# Patient Record
Sex: FEMALE | Race: WHITE
[De-identification: names, ages, dates, MRNs, and addresses within clinical notes are randomized per-mention and may not be internally consistent; named-entity substitution may affect disease eponyms.]

---

## 2017-03-07 ENCOUNTER — HOSPITAL ENCOUNTER (OUTPATIENT)
Dept: HOSPITAL 47 - LABWHC1 | Age: 62
End: 2017-03-07
Payer: COMMERCIAL

## 2017-03-07 DIAGNOSIS — E78.2: ICD-10-CM

## 2017-03-07 DIAGNOSIS — M81.0: ICD-10-CM

## 2017-03-07 DIAGNOSIS — L50.9: ICD-10-CM

## 2017-03-07 DIAGNOSIS — F32.9: ICD-10-CM

## 2017-03-07 DIAGNOSIS — E03.9: Primary | ICD-10-CM

## 2017-03-07 LAB
ALP SERPL-CCNC: 61 U/L (ref 38–126)
ALT SERPL-CCNC: 47 U/L (ref 9–52)
ANION GAP SERPL CALC-SCNC: 10 MMOL/L
AST SERPL-CCNC: 36 U/L (ref 14–36)
BASOPHILS # BLD AUTO: 0.1 K/UL (ref 0–0.2)
BASOPHILS NFR BLD AUTO: 1 %
BUN SERPL-SCNC: 19 MG/DL (ref 7–17)
CALCIUM SPEC-MCNC: 9.5 MG/DL (ref 8.4–10.2)
CH: 31
CHCM: 33.9
CHLORIDE SERPL-SCNC: 101 MMOL/L (ref 98–107)
CHOLEST SERPL-MCNC: 229 MG/DL (ref ?–200)
CO2 SERPL-SCNC: 28 MMOL/L (ref 22–30)
EOSINOPHIL # BLD AUTO: 0.2 K/UL (ref 0–0.7)
EOSINOPHIL NFR BLD AUTO: 3 %
ERYTHROCYTE [DISTWIDTH] IN BLOOD BY AUTOMATED COUNT: 4.6 M/UL (ref 3.8–5.4)
ERYTHROCYTE [DISTWIDTH] IN BLOOD: 12.7 % (ref 11.5–15.5)
GLUCOSE SERPL-MCNC: 93 MG/DL (ref 74–99)
HCT VFR BLD AUTO: 42.2 % (ref 34–46)
HDLC SERPL-MCNC: 57 MG/DL (ref 40–60)
HDW: 2.5
HGB BLD-MCNC: 13.9 GM/DL (ref 11.4–16)
LUC NFR BLD AUTO: 2 %
LYMPHOCYTES # SPEC AUTO: 2.2 K/UL (ref 1–4.8)
LYMPHOCYTES NFR SPEC AUTO: 29 %
MCH RBC QN AUTO: 30.1 PG (ref 25–35)
MCHC RBC AUTO-ENTMCNC: 32.8 G/DL (ref 31–37)
MCV RBC AUTO: 91.7 FL (ref 80–100)
MONOCYTES # BLD AUTO: 0.4 K/UL (ref 0–1)
MONOCYTES NFR BLD AUTO: 6 %
NEUTROPHILS # BLD AUTO: 4.3 K/UL (ref 1.3–7.7)
NEUTROPHILS NFR BLD AUTO: 59 %
NON-AFRICAN AMERICAN GFR(MDRD): >60
POTASSIUM SERPL-SCNC: 5 MMOL/L (ref 3.5–5.1)
PROT SERPL-MCNC: 6.8 G/DL (ref 6.3–8.2)
SODIUM SERPL-SCNC: 139 MMOL/L (ref 137–145)
TRIGL SERPL-MCNC: 177 MG/DL (ref ?–150)
VIT B12 SERPL-MCNC: 965 PG/ML (ref 239–931)
WBC # BLD AUTO: 0.14 10*3/UL
WBC # BLD AUTO: 7.3 K/UL (ref 3.8–10.6)
WBC (PEROX): 7.27

## 2017-03-07 PROCEDURE — 84443 ASSAY THYROID STIM HORMONE: CPT

## 2017-03-07 PROCEDURE — 82306 VITAMIN D 25 HYDROXY: CPT

## 2017-03-07 PROCEDURE — 85025 COMPLETE CBC W/AUTO DIFF WBC: CPT

## 2017-03-07 PROCEDURE — 82607 VITAMIN B-12: CPT

## 2017-03-07 PROCEDURE — 80061 LIPID PANEL: CPT

## 2017-03-07 PROCEDURE — 84439 ASSAY OF FREE THYROXINE: CPT

## 2017-03-07 PROCEDURE — 36415 COLL VENOUS BLD VENIPUNCTURE: CPT

## 2017-03-07 PROCEDURE — 80053 COMPREHEN METABOLIC PANEL: CPT

## 2017-09-14 ENCOUNTER — HOSPITAL ENCOUNTER (OUTPATIENT)
Dept: HOSPITAL 47 - RADMAMWWP | Age: 62
Discharge: HOME | End: 2017-09-14
Payer: COMMERCIAL

## 2017-09-14 DIAGNOSIS — Z12.31: Primary | ICD-10-CM

## 2017-09-14 DIAGNOSIS — N95.1: ICD-10-CM

## 2017-09-14 DIAGNOSIS — M85.88: ICD-10-CM

## 2017-09-14 PROCEDURE — 77080 DXA BONE DENSITY AXIAL: CPT

## 2017-09-14 NOTE — BD
EXAMINATION TYPE: MG DEXA axial skeleton.  

 

DATE OF EXAM: 9/14/2017

 

COMPARISON: NONE

 

CLINICAL HISTORY: N95.1 POST MENOPAUSAL, M89.9 DISORDER OF BONE

 

 

Height:  61

Weight:  130

 

FRAX RISK QUESTIONS:

Alcohol (3 or more units per day):  NO

Family History (Parent hip fracture):  NO

Glucocorticoids (More than 3mos):  NO

           (Ex: prednisone, prednisolone, methylprednisolone, dexamethasone, and hydrocortisone).    
     

History of Fracture in Adulthood: NO

Secondary Osteoporosis: NO

  1.  Type 1 Diabetes: NO

  2.  Hyperthyroidism: YES

  3.  Menopause before 45: NO

  4.  Malnutrition: NO

  5.  Chronic liver disease: NO

Rheumatoid Arthritis: NO

Current Tobacco Use: NO

 

RISK FACTORS 

HISTORY OF: 

Family History of Osteoporosis: NONE KNOWN

Active: NOT REALLY

Diet low in dairy products/other sources of calcium:  NO

Postmenopausal woman: AT 53 YRS OLD

Lost more than 2 inches in height since high school: NO

Hyperparathyroidism: YES

Adrenal Insufficiency: NO

 

MEDICATIONS: 

Thyroid Medications:  YES, SYNTHROID, .88

How Long: 3 YRS

Additional Medications:  VIT D3, PB MED, NEXIUM, PROZAC, XANAX AND OTHER ANTIDEPRESSANTS,  

 

 

Additional History: HYPERCALCEMIA, ANXIETY, HYPERTENSION 

 

 

EXAM MEASUREMENTS: 

Bone mineral densitometry was performed using the [employer] System.

Bone mineral density as measured about the Lumbar spine is:  

----- L1-L4(G/cm2): 1.168

T Score Values are as follows:

----- L1: -0.8

----- L2: -0.4

----- L3:  0.7

----- L4:  -0.2

----- L1-L4: -0.1

Bone mineral density    THIS IS HER FIRST BONE DENSITY TEST AT [facility]

 

Bone mineral density about the R hip (g/cm2): 0.886

Bone mineral density about the L hip (g/cm2): 0.871

T Score values are as follows:

-----R Neck: -1.7

-----L Neck: -1.5

-----R Total: -1.0

-----L Total:  -1.1

Bone mineral density    FIRST BONE DENSITY AT [facility] 

 

FRAX%'S:   THERE IS A 8.9% CHANCE FOR MAJOR OSTEOPOROTIC FX AND A 0.9% FOR A HIP FX.....PROBABILITY O
F FX IN 10 YRS TIME

 

IMPRESSION:

Osteopenia (T Score between -2.5 and -1 as noted by T score values in both hips.

There is slightly increased risk of fracture and the patient may be considered 

for treatment. Re-Screen 2-5 years     

 

NOTE:  T-SCORE=SD OF THE YOUNG ADULT MEAN.

## 2017-09-15 NOTE — MM
Reason for exam: screening  (asymptomatic).

Last mammogram was performed 1 year ago.



History:

Patient is postmenopausal.



Physical Findings:

A clinical breast exam by your physician is recommended on an annual basis and 

results should be correlated with mammographic findings.



MG Screening Mammo w CAD

Bilateral CC and MLO view(s) were taken.

Prior study comparison: September 13, 2016, bilateral MG screening mammo w CAD.  

February 22, 2016, right breast MG 3d diag mammo w/cad RT.

The breast tissue is extremely dense which could obscure a lesion on mammography. 

No significant changes when compared with prior studies.





ASSESSMENT: Benign, BI-RAD 2



RECOMMENDATION:

Routine screening mammogram of both breasts in 1 year.

## 2017-10-23 ENCOUNTER — HOSPITAL ENCOUNTER (OUTPATIENT)
Dept: HOSPITAL 47 - LABWHC1 | Age: 62
Discharge: HOME | End: 2017-10-23
Payer: COMMERCIAL

## 2017-10-23 DIAGNOSIS — E78.2: ICD-10-CM

## 2017-10-23 DIAGNOSIS — Z00.00: Primary | ICD-10-CM

## 2017-10-23 DIAGNOSIS — G43.909: ICD-10-CM

## 2017-10-23 DIAGNOSIS — F41.1: ICD-10-CM

## 2017-10-23 DIAGNOSIS — K76.0: ICD-10-CM

## 2017-10-23 DIAGNOSIS — E03.9: ICD-10-CM

## 2017-10-23 DIAGNOSIS — M81.0: ICD-10-CM

## 2017-10-23 LAB
ALP SERPL-CCNC: 62 U/L (ref 38–126)
ALT SERPL-CCNC: 33 U/L (ref 9–52)
ANION GAP SERPL CALC-SCNC: 8 MMOL/L
AST SERPL-CCNC: 26 U/L (ref 14–36)
BASOPHILS # BLD AUTO: 0 K/UL (ref 0–0.2)
BASOPHILS NFR BLD AUTO: 1 %
BUN SERPL-SCNC: 17 MG/DL (ref 7–17)
CALCIUM SPEC-MCNC: 9.3 MG/DL (ref 8.4–10.2)
CH: 31
CHCM: 33.5
CHLORIDE SERPL-SCNC: 98 MMOL/L (ref 98–107)
CHOLEST SERPL-MCNC: 232 MG/DL (ref ?–200)
CO2 SERPL-SCNC: 28 MMOL/L (ref 22–30)
EOSINOPHIL # BLD AUTO: 0.1 K/UL (ref 0–0.7)
EOSINOPHIL NFR BLD AUTO: 2 %
ERYTHROCYTE [DISTWIDTH] IN BLOOD BY AUTOMATED COUNT: 4.12 M/UL (ref 3.8–5.4)
ERYTHROCYTE [DISTWIDTH] IN BLOOD: 12.2 % (ref 11.5–15.5)
GLUCOSE SERPL-MCNC: 88 MG/DL (ref 74–99)
HCT VFR BLD AUTO: 38.3 % (ref 34–46)
HDLC SERPL-MCNC: 64 MG/DL (ref 40–60)
HDW: 2.25
HGB BLD-MCNC: 12.6 GM/DL (ref 11.4–16)
LUC NFR BLD AUTO: 2 %
LYMPHOCYTES # SPEC AUTO: 1.7 K/UL (ref 1–4.8)
LYMPHOCYTES NFR SPEC AUTO: 31 %
MAGNESIUM SPEC-SCNC: 2 MG/DL (ref 1.6–2.3)
MCH RBC QN AUTO: 30.6 PG (ref 25–35)
MCHC RBC AUTO-ENTMCNC: 32.9 G/DL (ref 31–37)
MCV RBC AUTO: 92.9 FL (ref 80–100)
MONOCYTES # BLD AUTO: 0.4 K/UL (ref 0–1)
MONOCYTES NFR BLD AUTO: 6 %
NEUTROPHILS # BLD AUTO: 3.3 K/UL (ref 1.3–7.7)
NEUTROPHILS NFR BLD AUTO: 59 %
NON-AFRICAN AMERICAN GFR(MDRD): >60
POTASSIUM SERPL-SCNC: 4.5 MMOL/L (ref 3.5–5.1)
PROT SERPL-MCNC: 6.5 G/DL (ref 6.3–8.2)
SODIUM SERPL-SCNC: 134 MMOL/L (ref 137–145)
VIT B12 SERPL-MCNC: 1085 PG/ML (ref 200–944)
WBC # BLD AUTO: 0.09 10*3/UL
WBC # BLD AUTO: 5.6 K/UL (ref 3.8–10.6)
WBC (PEROX): 5.55

## 2017-10-23 PROCEDURE — 80061 LIPID PANEL: CPT

## 2017-10-23 PROCEDURE — 85025 COMPLETE CBC W/AUTO DIFF WBC: CPT

## 2017-10-23 PROCEDURE — 82306 VITAMIN D 25 HYDROXY: CPT

## 2017-10-23 PROCEDURE — 82607 VITAMIN B-12: CPT

## 2017-10-23 PROCEDURE — 84439 ASSAY OF FREE THYROXINE: CPT

## 2017-10-23 PROCEDURE — 84443 ASSAY THYROID STIM HORMONE: CPT

## 2017-10-23 PROCEDURE — 83735 ASSAY OF MAGNESIUM: CPT

## 2017-10-23 PROCEDURE — 36415 COLL VENOUS BLD VENIPUNCTURE: CPT

## 2017-10-23 PROCEDURE — 80053 COMPREHEN METABOLIC PANEL: CPT

## 2018-03-15 ENCOUNTER — HOSPITAL ENCOUNTER (OUTPATIENT)
Dept: HOSPITAL 47 - LABWHC1 | Age: 63
Discharge: HOME | End: 2018-03-15
Payer: COMMERCIAL

## 2018-03-15 DIAGNOSIS — E03.9: ICD-10-CM

## 2018-03-15 DIAGNOSIS — R79.89: ICD-10-CM

## 2018-03-15 DIAGNOSIS — G47.00: Primary | ICD-10-CM

## 2018-03-15 DIAGNOSIS — E78.1: ICD-10-CM

## 2018-03-15 DIAGNOSIS — K76.0: ICD-10-CM

## 2018-03-15 LAB
ALBUMIN SERPL-MCNC: 4.3 G/DL (ref 3.5–5)
ALP SERPL-CCNC: 72 U/L (ref 38–126)
ALT SERPL-CCNC: 37 U/L (ref 9–52)
ANION GAP SERPL CALC-SCNC: 11 MMOL/L
AST SERPL-CCNC: 34 U/L (ref 14–36)
BUN SERPL-SCNC: 20 MG/DL (ref 7–17)
CALCIUM SPEC-MCNC: 10.2 MG/DL (ref 8.4–10.2)
CHLORIDE SERPL-SCNC: 97 MMOL/L (ref 98–107)
CHOLEST SERPL-MCNC: 233 MG/DL (ref ?–200)
CO2 SERPL-SCNC: 30 MMOL/L (ref 22–30)
ERYTHROCYTE [DISTWIDTH] IN BLOOD BY AUTOMATED COUNT: 4.51 M/UL (ref 3.8–5.4)
ERYTHROCYTE [DISTWIDTH] IN BLOOD: 12.8 % (ref 11.5–15.5)
GLUCOSE SERPL-MCNC: 84 MG/DL (ref 74–99)
HBA1C MFR BLD: 5.2 % (ref 4–6)
HCT VFR BLD AUTO: 40.5 % (ref 34–46)
HDLC SERPL-MCNC: 59 MG/DL (ref 40–60)
HGB BLD-MCNC: 13.5 GM/DL (ref 11.4–16)
LDLC SERPL CALC-MCNC: 154 MG/DL (ref 0–99)
MCH RBC QN AUTO: 30 PG (ref 25–35)
MCHC RBC AUTO-ENTMCNC: 33.4 G/DL (ref 31–37)
MCV RBC AUTO: 89.8 FL (ref 80–100)
PLATELET # BLD AUTO: 382 K/UL (ref 150–450)
POTASSIUM SERPL-SCNC: 5.4 MMOL/L (ref 3.5–5.1)
PROT SERPL-MCNC: 6.9 G/DL (ref 6.3–8.2)
SODIUM SERPL-SCNC: 138 MMOL/L (ref 137–145)
T4 FREE SERPL-MCNC: 1.78 NG/DL (ref 0.78–2.19)
TRIGL SERPL-MCNC: 100 MG/DL (ref ?–150)
URATE SERPL-MCNC: 3.5 MG/DL (ref 3.7–7.4)
VIT B12 SERPL-MCNC: 1245 PG/ML (ref 200–944)
WBC # BLD AUTO: 8.5 K/UL (ref 3.8–10.6)

## 2018-03-15 PROCEDURE — 84439 ASSAY OF FREE THYROXINE: CPT

## 2018-03-15 PROCEDURE — 84550 ASSAY OF BLOOD/URIC ACID: CPT

## 2018-03-15 PROCEDURE — 80053 COMPREHEN METABOLIC PANEL: CPT

## 2018-03-15 PROCEDURE — 80074 ACUTE HEPATITIS PANEL: CPT

## 2018-03-15 PROCEDURE — 82533 TOTAL CORTISOL: CPT

## 2018-03-15 PROCEDURE — 84443 ASSAY THYROID STIM HORMONE: CPT

## 2018-03-15 PROCEDURE — 82607 VITAMIN B-12: CPT

## 2018-03-15 PROCEDURE — 83036 HEMOGLOBIN GLYCOSYLATED A1C: CPT

## 2018-03-15 PROCEDURE — 80061 LIPID PANEL: CPT

## 2018-03-15 PROCEDURE — 36415 COLL VENOUS BLD VENIPUNCTURE: CPT

## 2018-03-15 PROCEDURE — 85027 COMPLETE CBC AUTOMATED: CPT

## 2018-08-15 ENCOUNTER — HOSPITAL ENCOUNTER (OUTPATIENT)
Dept: HOSPITAL 47 - LABWHC1 | Age: 63
Discharge: HOME | End: 2018-08-15
Attending: FAMILY MEDICINE
Payer: COMMERCIAL

## 2018-08-15 DIAGNOSIS — E78.2: ICD-10-CM

## 2018-08-15 DIAGNOSIS — Z00.00: Primary | ICD-10-CM

## 2018-08-15 DIAGNOSIS — F41.1: ICD-10-CM

## 2018-08-15 DIAGNOSIS — G43.909: ICD-10-CM

## 2018-08-15 DIAGNOSIS — R53.82: ICD-10-CM

## 2018-08-15 DIAGNOSIS — M81.0: ICD-10-CM

## 2018-08-15 LAB
ALBUMIN SERPL-MCNC: 4.1 G/DL (ref 3.5–5)
ALP SERPL-CCNC: 68 U/L (ref 38–126)
ALT SERPL-CCNC: 49 U/L (ref 9–52)
ANION GAP SERPL CALC-SCNC: 6 MMOL/L
AST SERPL-CCNC: 37 U/L (ref 14–36)
BASOPHILS # BLD AUTO: 0.1 K/UL (ref 0–0.2)
BASOPHILS NFR BLD AUTO: 1 %
BUN SERPL-SCNC: 20 MG/DL (ref 7–17)
CALCIUM SPEC-MCNC: 9.4 MG/DL (ref 8.4–10.2)
CHLORIDE SERPL-SCNC: 99 MMOL/L (ref 98–107)
CHOLEST SERPL-MCNC: 263 MG/DL (ref ?–200)
CK SERPL-CCNC: 48 U/L (ref 30–135)
CO2 SERPL-SCNC: 29 MMOL/L (ref 22–30)
EOSINOPHIL # BLD AUTO: 0.2 K/UL (ref 0–0.7)
EOSINOPHIL NFR BLD AUTO: 2 %
ERYTHROCYTE [DISTWIDTH] IN BLOOD BY AUTOMATED COUNT: 4.33 M/UL (ref 3.8–5.4)
ERYTHROCYTE [DISTWIDTH] IN BLOOD: 13.3 % (ref 11.5–15.5)
GLUCOSE SERPL-MCNC: 89 MG/DL (ref 74–99)
HBA1C MFR BLD: 5.6 % (ref 4–6)
HCT VFR BLD AUTO: 40 % (ref 34–46)
HDLC SERPL-MCNC: 63 MG/DL (ref 40–60)
HGB BLD-MCNC: 13.2 GM/DL (ref 11.4–16)
LDLC SERPL CALC-MCNC: 174 MG/DL (ref 0–99)
LYMPHOCYTES # SPEC AUTO: 1.8 K/UL (ref 1–4.8)
LYMPHOCYTES NFR SPEC AUTO: 19 %
MAGNESIUM SPEC-SCNC: 2.2 MG/DL (ref 1.6–2.3)
MCH RBC QN AUTO: 30.4 PG (ref 25–35)
MCHC RBC AUTO-ENTMCNC: 32.9 G/DL (ref 31–37)
MCV RBC AUTO: 92.4 FL (ref 80–100)
MONOCYTES # BLD AUTO: 0.5 K/UL (ref 0–1)
MONOCYTES NFR BLD AUTO: 6 %
NEUTROPHILS # BLD AUTO: 6.8 K/UL (ref 1.3–7.7)
NEUTROPHILS NFR BLD AUTO: 71 %
PLATELET # BLD AUTO: 391 K/UL (ref 150–450)
POTASSIUM SERPL-SCNC: 5.2 MMOL/L (ref 3.5–5.1)
PROT SERPL-MCNC: 6.5 G/DL (ref 6.3–8.2)
SODIUM SERPL-SCNC: 134 MMOL/L (ref 137–145)
T4 FREE SERPL-MCNC: 1.23 NG/DL (ref 0.78–2.19)
TRIGL SERPL-MCNC: 128 MG/DL (ref ?–150)
VIT B12 SERPL-MCNC: 713 PG/ML (ref 200–944)
WBC # BLD AUTO: 9.5 K/UL (ref 3.8–10.6)

## 2018-08-15 PROCEDURE — 80053 COMPREHEN METABOLIC PANEL: CPT

## 2018-08-15 PROCEDURE — 82306 VITAMIN D 25 HYDROXY: CPT

## 2018-08-15 PROCEDURE — 84439 ASSAY OF FREE THYROXINE: CPT

## 2018-08-15 PROCEDURE — 82550 ASSAY OF CK (CPK): CPT

## 2018-08-15 PROCEDURE — 84443 ASSAY THYROID STIM HORMONE: CPT

## 2018-08-15 PROCEDURE — 82607 VITAMIN B-12: CPT

## 2018-08-15 PROCEDURE — 83036 HEMOGLOBIN GLYCOSYLATED A1C: CPT

## 2018-08-15 PROCEDURE — 36415 COLL VENOUS BLD VENIPUNCTURE: CPT

## 2018-08-15 PROCEDURE — 80061 LIPID PANEL: CPT

## 2018-08-15 PROCEDURE — 85025 COMPLETE CBC W/AUTO DIFF WBC: CPT

## 2018-08-15 PROCEDURE — 83735 ASSAY OF MAGNESIUM: CPT

## 2019-01-01 ENCOUNTER — HOSPITAL ENCOUNTER (EMERGENCY)
Dept: HOSPITAL 47 - EC | Age: 64
Discharge: HOME | End: 2019-01-01
Payer: COMMERCIAL

## 2019-01-01 VITALS — RESPIRATION RATE: 16 BRPM | SYSTOLIC BLOOD PRESSURE: 139 MMHG | HEART RATE: 67 BPM | DIASTOLIC BLOOD PRESSURE: 63 MMHG

## 2019-01-01 VITALS — TEMPERATURE: 97.7 F

## 2019-01-01 DIAGNOSIS — S82.124A: Primary | ICD-10-CM

## 2019-01-01 DIAGNOSIS — Z87.891: ICD-10-CM
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
## 2019-01-01 PROCEDURE — 99284 EMERGENCY DEPT VISIT MOD MDM: CPT

## 2019-01-01 PROCEDURE — 73564 X-RAY EXAM KNEE 4 OR MORE: CPT

## 2019-01-01 PROCEDURE — 73590 X-RAY EXAM OF LOWER LEG: CPT

## 2019-01-01 PROCEDURE — 73700 CT LOWER EXTREMITY W/O DYE: CPT

## 2019-01-01 NOTE — XR
EXAMINATION TYPE: XR knee 4V RT

 

DATE OF EXAM: 1/1/2019

 

COMPARISON: NONE

 

HISTORY: Pain after fall

 

TECHNIQUE: 4 views

 

FINDINGS: There is knee joint effusion. There is a vertical fracture through the tibial spines and al
so lateral tibial condyle. There is no dislocation. There is no displacement of the fragments. Distal
 femur is intact.

 

IMPRESSION: Nondisplaced fracture of the tibial spines and lateral tibial condyle.

## 2019-01-01 NOTE — XR
EXAMINATION TYPE: XR tibia fibula RT

 

DATE OF EXAM: 1/1/2019

 

COMPARISON: NONE

 

HISTORY: Fall. Pain.

 

TECHNIQUE: 4 views

 

FINDINGS: There is a vertical fracture through the lateral tibial condyle. There is knee joint effusi
on. Distal tibia and fibula appear intact. Ankle mortise is anatomic. Distal femur appears intact.

 

IMPRESSION: Nondisplaced fracture of the lateral tibial condyle. Fracture line extends to the articul
ar surface.

## 2019-01-01 NOTE — ED
General Adult HPI





- General


Chief complaint: Extremity Injury, Lower


Stated complaint: Leg injury


Time Seen by Provider: 01/01/19 16:54


Source: patient, RN notes reviewed


Mode of arrival: ambulatory


Limitations: no limitations





- History of Present Illness


Initial comments: 





63-year-old female presents to the emergency department for a chief complaint 

of right knee pain 1 hour.  Patient states she was on a deck about 3 feet off 

the ground when she fell onto her bent right knee.  She states she has 

inability to bear weight on the right knee and pain with movement of the right 

knee.  She denies pain otherwise.  She denies hitting her head neck or back.  

Patient has no other complaints at this time including shortness of breath, 

chest pain, abdominal pain, nausea or vomiting, headache, or visual changes.





- Related Data


 Allergies











Allergy/AdvReac Type Severity Reaction Status Date / Time


 


No Known Allergies Allergy   Verified 01/01/19 16:36














Review of Systems


ROS Statement: 


Those systems with pertinent positive or pertinent negative responses have been 

documented in the HPI.





ROS Other: All systems not noted in ROS Statement are negative.





Past Medical History


Past Medical History: No Reported History


History of Any Multi-Drug Resistant Organisms: None Reported


Past Surgical History: Tubal Ligation


Additional Past Surgical History / Comment(s): parathyroid removed


Past Psychological History: No Psychological Hx Reported


Smoking Status: Former smoker


Past Alcohol Use History: Occasional


Past Drug Use History: None Reported





General Exam


Limitations: no limitations


General appearance: alert, in no apparent distress


Head exam: Present: atraumatic, normocephalic, normal inspection


Eye exam: Present: normal appearance, PERRL, EOMI.  Absent: scleral icterus, 

conjunctival injection, periorbital swelling


ENT exam: Present: normal exam, mucous membranes moist


Neck exam: Present: normal inspection, full ROM.  Absent: tenderness, 

meningismus, lymphadenopathy


Respiratory exam: Present: normal lung sounds bilaterally.  Absent: respiratory 

distress, wheezes, rales, rhonchi, stridor


Cardiovascular Exam: Present: regular rate, normal rhythm, normal heart sounds.

  Absent: systolic murmur, diastolic murmur, rubs, gallop, clicks


GI/Abdominal exam: Present: soft, normal bowel sounds.  Absent: distended, 

tenderness, guarding, rebound, rigid


Extremities exam: Present: tenderness (Tenderness noted to the lateral aspect 

of the right knee as well as minimal posterior tenderness.), normal capillary 

refill (Capillary refill less than 2 seconds and DP pulse 2+ the right lower 

extremity), joint swelling (Patient has mild edema noted of the right knee, no 

erythema, ecchymosis, abrasions.  No increased warmth or evidence of infection.)

.  Absent: full ROM (Patient has 90 flexion of the right knee.  120 extension 

of the right knee.), calf tenderness (No tenderness of the calf, negative 

Homans sign)


Back exam: Absent: vertebral tenderness (No vertebral tenderness)


Neurological exam: Present: alert, oriented X3, CN II-XII intact


Psychiatric exam: Present: normal affect, normal mood





Course


 Vital Signs











  01/01/19 01/01/19





  16:32 20:57


 


Temperature 97.7 F 97.7 F


 


Pulse Rate 65 67


 


Respiratory 18 16





Rate  


 


Blood Pressure 134/68 139/63


 


O2 Sat by Pulse 99 97





Oximetry  














Medical Decision Making





- Medical Decision Making


63-year-old female presents for chief complaint of right knee pain after a fall 

today from about 3 feet.  Patient fell off her deck onto her right knee.  No 

other injuries.  She states she cannot ambulate on the right knee and has 

difficulty moving the right knee.  Neurovascular intact on exam.  X-ray shows a 

nondisplaced fracture of the tibial spines and lateral tibial condyle.  Dr. Edgar discussed with Dr. Pritchard who recommended knee immobilizer and a 

computed tomography scan before discharge.  CT report pending.  Patient will 

follow-up for this at orthopedics.  She will return if she has any worsening 

symptoms.





Disposition


Clinical Impression: 


 Tibial plateau fracture





Disposition: HOME SELF-CARE


Condition: Good


Instructions:  Knee Pain (ED)


Additional Instructions: 


Please use knee immobilizer and remain nonweightbearing until you see 

orthopedics. Follow up with orthopedics tomorrow. Please take Tylenol 3 as 

directed.  Return if you have any worsening symptoms.


Is patient prescribed a controlled substance at d/c from ED?: No


Referrals: 


Edith Guerrero MD [Primary Care Provider] - 1-2 days


Time of Disposition: 20:26

## 2019-01-01 NOTE — CT
EXAMINATION TYPE: CT knee LT wo con

 

DATE OF EXAM: 1/1/2019

 

COMPARISON: None

 

HISTORY: Fall today.

 

CT DLP: 101.8 mGycm

Automated exposure control for dose reduction was used.

 

FINDINGS: 

I see no fracture nor dislocation. There is slight narrowing of the medial joint space. There is no s
ign of joint effusion. I see no focal bone destruction. There is no evidence of a soft tissue mass.

 

IMPRESSION: 

NEGATIVE CT SCAN OF THE LEFT KNEE. NO FRACTURE. There is slight narrowing of the medial joint space w
ithout significant spurring.

## 2019-01-02 NOTE — CT
EXAMINATION TYPE: CT knee RT wo con

 

DATE OF EXAM: 1/2/2019

 

COMPARISON: 1/1/2019

 

HISTORY: Right knee fracture after fall

 

TECHNIQUE: Standard multiplanar unenhanced CT of the right knee was performed.

 

FINDINGS: 

There is redemonstration of the known right lateral tibial plateau impaction fracture with intra-asael
cular extension and comminution. There is depression of approximately 2 mm anteriorly and 6 mm at the
 posterior aspect of the fracture site. Displaced fragments are seen 2 mm posterior to the lateral ti
bial plateau. Vertical component is seen on sagittal image 33 extending into the metaphysis.

 

Additional comminuted fracture is seen of the medial tibial plateau extending into the medial tibial 
eminence with 3 mm of distraction of the tibial eminence suggesting underlying anterior cruciate liga
ment injury.

 

There is an associated moderate lipohemarthrosis and overlying soft tissue swelling. Osseous fragment
 of the lateral patellar facet such as on axial image 33 and 32 likely relates to a small osteophyte 
at the retinaculum appears grossly intact. Evaluation of the tendons and ligaments are limited on CT.
 There does appear to be a popliteal fossa cyst.

 

IMPRESSION:

1. REDEMONSTRATION OF THE KNOWN MEDIAL AND LATERAL TIBIAL PLATEAU IMPACTION FRACTURES WITH DEPRESSION
 OF THE POSTERIOR ASPECT OF THE LATERAL TIBIAL PLATEAU COMMINUTED INTRA-ARTICULAR IMPACTION FRACTURE 
OF 6 MM.

2. TIBIAL EMINENCE 3 MM DISTRACTION SUGGESTING ANTERIOR CRUCIATE LIGAMENT INJURY.

3. MODERATE ASSOCIATED LIPOHEMARTHROSIS AND MILD OVERLYING SOFT TISSUE SWELLING.

## 2019-03-27 ENCOUNTER — HOSPITAL ENCOUNTER (OUTPATIENT)
Dept: HOSPITAL 47 - LABWHC1 | Age: 64
Discharge: HOME | End: 2019-03-27
Attending: FAMILY MEDICINE
Payer: COMMERCIAL

## 2019-03-27 DIAGNOSIS — E03.9: ICD-10-CM

## 2019-03-27 DIAGNOSIS — F51.12: ICD-10-CM

## 2019-03-27 DIAGNOSIS — R53.82: ICD-10-CM

## 2019-03-27 DIAGNOSIS — F41.1: Primary | ICD-10-CM

## 2019-03-27 LAB
ALBUMIN SERPL-MCNC: 4.2 G/DL (ref 3.8–4.9)
ALBUMIN/GLOB SERPL: 2.47 G/DL (ref 1.6–3.17)
ALP SERPL-CCNC: 75 U/L (ref 41–126)
ALT SERPL-CCNC: 25 U/L (ref 8–44)
ANION GAP SERPL CALC-SCNC: 6.5 MMOL/L (ref 4–12)
AST SERPL-CCNC: 27 U/L (ref 13–35)
BASOPHILS # BLD AUTO: 0 K/UL (ref 0–0.2)
BASOPHILS NFR BLD AUTO: 1 %
BUN SERPL-SCNC: 14 MG/DL (ref 9–27)
CALCIUM SPEC-MCNC: 9 MG/DL (ref 8.7–10.3)
CHLORIDE SERPL-SCNC: 103 MMOL/L (ref 96–109)
CHOLEST SERPL-MCNC: 226 MG/DL (ref 0–200)
CK SERPL-CCNC: 54 U/L (ref 26–186)
CO2 SERPL-SCNC: 26.5 MMOL/L (ref 21.6–31.8)
EOSINOPHIL # BLD AUTO: 0.2 K/UL (ref 0–0.7)
EOSINOPHIL NFR BLD AUTO: 3 %
ERYTHROCYTE [DISTWIDTH] IN BLOOD BY AUTOMATED COUNT: 4.28 M/UL (ref 3.8–5.4)
ERYTHROCYTE [DISTWIDTH] IN BLOOD: 13.5 % (ref 11.5–15.5)
GLOBULIN SER CALC-MCNC: 1.7 G/DL (ref 1.6–3.3)
GLUCOSE SERPL-MCNC: 90 MG/DL (ref 70–110)
HBA1C MFR BLD: 5.5 % (ref 4–6)
HCT VFR BLD AUTO: 39.7 % (ref 34–46)
HDLC SERPL-MCNC: 62 MG/DL (ref 40–60)
HGB BLD-MCNC: 12.9 GM/DL (ref 11.4–16)
LDLC SERPL CALC-MCNC: 146.6 MG/DL (ref 0–131)
LYMPHOCYTES # SPEC AUTO: 1.9 K/UL (ref 1–4.8)
LYMPHOCYTES NFR SPEC AUTO: 28 %
MCH RBC QN AUTO: 30.1 PG (ref 25–35)
MCHC RBC AUTO-ENTMCNC: 32.4 G/DL (ref 31–37)
MCV RBC AUTO: 92.7 FL (ref 80–100)
MONOCYTES # BLD AUTO: 0.3 K/UL (ref 0–1)
MONOCYTES NFR BLD AUTO: 5 %
NEUTROPHILS # BLD AUTO: 4.1 K/UL (ref 1.3–7.7)
NEUTROPHILS NFR BLD AUTO: 61 %
PH UR: 7 [PH] (ref 5–8)
PLATELET # BLD AUTO: 341 K/UL (ref 150–450)
POTASSIUM SERPL-SCNC: 4.6 MMOL/L (ref 3.5–5.5)
PROT SERPL-MCNC: 5.9 G/DL (ref 6.2–8.2)
SODIUM SERPL-SCNC: 136 MMOL/L (ref 135–145)
SP GR UR: 1.01 (ref 1–1.03)
T4 FREE SERPL-MCNC: 1.3 NG/DL (ref 0.8–1.8)
TRIGL SERPL-MCNC: 87 MG/DL (ref 0–149)
UROBILINOGEN UR QL STRIP: <2 MG/DL (ref ?–2)
VIT B12 SERPL-MCNC: 857 PG/ML (ref 200–944)
VLDLC SERPL CALC-MCNC: 17.4 MG/DL (ref 5–40)
WBC # BLD AUTO: 6.7 K/UL (ref 3.8–10.6)

## 2019-03-27 PROCEDURE — 80061 LIPID PANEL: CPT

## 2019-03-27 PROCEDURE — 84443 ASSAY THYROID STIM HORMONE: CPT

## 2019-03-27 PROCEDURE — 82550 ASSAY OF CK (CPK): CPT

## 2019-03-27 PROCEDURE — 82306 VITAMIN D 25 HYDROXY: CPT

## 2019-03-27 PROCEDURE — 36415 COLL VENOUS BLD VENIPUNCTURE: CPT

## 2019-03-27 PROCEDURE — 82607 VITAMIN B-12: CPT

## 2019-03-27 PROCEDURE — 80053 COMPREHEN METABOLIC PANEL: CPT

## 2019-03-27 PROCEDURE — 81003 URINALYSIS AUTO W/O SCOPE: CPT

## 2019-03-27 PROCEDURE — 84439 ASSAY OF FREE THYROXINE: CPT

## 2019-03-27 PROCEDURE — 85025 COMPLETE CBC W/AUTO DIFF WBC: CPT

## 2019-03-27 PROCEDURE — 83036 HEMOGLOBIN GLYCOSYLATED A1C: CPT

## 2019-09-16 ENCOUNTER — HOSPITAL ENCOUNTER (OUTPATIENT)
Dept: HOSPITAL 47 - RADMAMWWP | Age: 64
Discharge: HOME | End: 2019-09-16
Attending: FAMILY MEDICINE
Payer: COMMERCIAL

## 2019-09-16 DIAGNOSIS — M85.80: ICD-10-CM

## 2019-09-16 DIAGNOSIS — Z12.31: Primary | ICD-10-CM

## 2019-09-16 PROCEDURE — 77067 SCR MAMMO BI INCL CAD: CPT

## 2019-09-16 PROCEDURE — 77080 DXA BONE DENSITY AXIAL: CPT

## 2019-09-16 PROCEDURE — 77063 BREAST TOMOSYNTHESIS BI: CPT

## 2019-09-16 NOTE — BD
EXAMINATION TYPE: Axial Bone Density

 

DATE OF EXAM: 9/16/2019

 

COMPARISON: 2017

 

CLINICAL HISTORY: 62-year-old female with known osteoporosis

 

Height:  61 inches

Weight:  131

 

FRAX RISK QUESTIONS:

Alcohol (3 or more units per day):  no

Family History (Parent hip fracture):  no

Glucocorticoids (More than 3mos):  no

           (Ex: prednisone, prednisolone, methylprednisolone, dexamethasone, and hydrocortisone).    
     

History of Fracture in Adulthood: yes

Secondary Osteoporosis:

  1.  Type 1 Diabetes: no

  2.  Hyperthyroidism: YES

  3.  Menopause before 45: no

  4.  Malnutrition: no

  5.  Chronic liver disease: no

Rheumatoid Arthritis: no

Current Tobacco Use: no

 

RISK FACTORS 

HISTORY OF: 

Family History of Osteoporosis: unsure

Active: yes

Diet low in dairy products/other sources of calcium:  no

Postmenopausal woman: yes

Take estrogen and/or progesterone medications: no

Lost more than 2 inches in height since high school: no

 

Frequent falls: no

Poor Health: no

Hyperparathyroidism: YES

Adrenal Insufficiency: no

 

MEDICATIONS: 

Prednisone or other steroids: no

Thyroid Medications: yes 

Which medication: Synthroid

How Long: over 40 years

Osteoporosis Medications: no

Additional Medications: blood pressure med , Vitamin B12

 

 

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

 

 

EXAM MEASUREMENTS: 

Bone mineral densitometry was performed using the [employer] System.

Bone mineral density as measured about the Lumbar spine is:  

----- L1-L4(G/cm2): 1.199

T Score Values are as follows:

----- L2: 0.1

----- L3: 0.7

----- L4: 0.2

----- L1-L4: 0.2

Bone mineral density has: Increased 3.1% since study of: 09/14/2017

 

Bone mineral density about the R hip (g/cm2): 0.769

Bone mineral density about the L hip (g/cm2): 0.828

T Score values are as follows:

-----R Neck: -1.9

-----L Neck: -1.5

-----R Total: -1.4

-----L Total: -1.3

Bone mineral density has: Decreased -5.1% since study of: 09/14/2017

 

 

IMPRESSION:

Osteopenia (T Score between -2.5 and -1).

 

There is slightly increased risk of fracture and the patient may be considered 

for treatment. 

 

Re-Screen 2-5 years.

 

 

 

 

 

NOTE:  T-SCORE=SD OF THE YOUNG ADULT MEAN.

## 2019-09-18 NOTE — MM
Reason for exam: screening  (asymptomatic).

Last mammogram was performed 2 years ago.



History:

Patient is postmenopausal.



Physical Findings:

A clinical breast exam by your physician is recommended on an annual basis and 

results should be correlated with mammographic findings.



MG 3D Screening Mammo W/Cad

Bilateral CC and MLO view(s) were taken.

Prior study comparison: September 14, 2017, bilateral MG screening mammo w CAD.  

September 13, 2016, bilateral MG screening mammo w CAD.

The breast tissue is heterogeneously dense. This may lower the sensitivity of 

mammography.  No significant changes when compared with prior studies.





ASSESSMENT: Negative, BI-RAD 1



RECOMMENDATION:

Routine screening mammogram of both breasts in 1 year.

## 2019-10-03 ENCOUNTER — HOSPITAL ENCOUNTER (OUTPATIENT)
Dept: HOSPITAL 47 - LABWHC1 | Age: 64
Discharge: HOME | End: 2019-10-03
Attending: FAMILY MEDICINE
Payer: COMMERCIAL

## 2019-10-03 DIAGNOSIS — R41.3: ICD-10-CM

## 2019-10-03 DIAGNOSIS — Z00.00: Primary | ICD-10-CM

## 2019-10-03 DIAGNOSIS — E78.2: ICD-10-CM

## 2019-10-03 DIAGNOSIS — E03.9: ICD-10-CM

## 2019-10-03 LAB
BASOPHILS # BLD AUTO: 0.1 K/UL (ref 0–0.2)
BASOPHILS NFR BLD AUTO: 1 %
EOSINOPHIL # BLD AUTO: 0.2 K/UL (ref 0–0.7)
EOSINOPHIL NFR BLD AUTO: 2 %
ERYTHROCYTE [DISTWIDTH] IN BLOOD BY AUTOMATED COUNT: 4.27 M/UL (ref 3.8–5.4)
ERYTHROCYTE [DISTWIDTH] IN BLOOD: 12.6 % (ref 11.5–15.5)
HBA1C MFR BLD: 5.5 % (ref 4–6)
HCT VFR BLD AUTO: 39.8 % (ref 34–46)
HGB BLD-MCNC: 13 GM/DL (ref 11.4–16)
LYMPHOCYTES # SPEC AUTO: 1.9 K/UL (ref 1–4.8)
LYMPHOCYTES NFR SPEC AUTO: 26 %
MCH RBC QN AUTO: 30.6 PG (ref 25–35)
MCHC RBC AUTO-ENTMCNC: 32.8 G/DL (ref 31–37)
MCV RBC AUTO: 93.3 FL (ref 80–100)
MONOCYTES # BLD AUTO: 0.4 K/UL (ref 0–1)
MONOCYTES NFR BLD AUTO: 6 %
NEUTROPHILS # BLD AUTO: 4.6 K/UL (ref 1.3–7.7)
NEUTROPHILS NFR BLD AUTO: 63 %
PLATELET # BLD AUTO: 329 K/UL (ref 150–450)
T4 FREE SERPL-MCNC: 1.3 NG/DL (ref 0.8–1.8)
WBC # BLD AUTO: 7.4 K/UL (ref 3.8–10.6)

## 2019-10-03 PROCEDURE — 36415 COLL VENOUS BLD VENIPUNCTURE: CPT

## 2019-10-03 PROCEDURE — 82607 VITAMIN B-12: CPT

## 2019-10-03 PROCEDURE — 83036 HEMOGLOBIN GLYCOSYLATED A1C: CPT

## 2019-10-03 PROCEDURE — 83550 IRON BINDING TEST: CPT

## 2019-10-03 PROCEDURE — 84439 ASSAY OF FREE THYROXINE: CPT

## 2019-10-03 PROCEDURE — 83540 ASSAY OF IRON: CPT

## 2019-10-03 PROCEDURE — 80061 LIPID PANEL: CPT

## 2019-10-03 PROCEDURE — 82550 ASSAY OF CK (CPK): CPT

## 2019-10-03 PROCEDURE — 84443 ASSAY THYROID STIM HORMONE: CPT

## 2019-10-03 PROCEDURE — 80053 COMPREHEN METABOLIC PANEL: CPT

## 2019-10-03 PROCEDURE — 85025 COMPLETE CBC W/AUTO DIFF WBC: CPT

## 2019-10-04 LAB
ALBUMIN SERPL-MCNC: 4.4 G/DL (ref 3.8–4.9)
ALBUMIN/GLOB SERPL: 2.44 G/DL (ref 1.6–3.17)
ALP SERPL-CCNC: 82 U/L (ref 41–126)
ALT SERPL-CCNC: 33 U/L (ref 8–44)
ANION GAP SERPL CALC-SCNC: 9.7 MMOL/L (ref 4–12)
AST SERPL-CCNC: 30 U/L (ref 13–35)
BUN SERPL-SCNC: 28 MG/DL (ref 9–27)
BUN/CREAT SERPL: 28 RATIO (ref 12–20)
CALCIUM SPEC-MCNC: 9.4 MG/DL (ref 8.7–10.3)
CHLORIDE SERPL-SCNC: 99 MMOL/L (ref 96–109)
CHOLEST SERPL-MCNC: 250 MG/DL (ref 0–200)
CK SERPL-CCNC: 52 U/L (ref 26–186)
CO2 SERPL-SCNC: 25.3 MMOL/L (ref 21.6–31.8)
GLOBULIN SER CALC-MCNC: 1.8 G/DL (ref 1.6–3.3)
GLUCOSE SERPL-MCNC: 84 MG/DL (ref 70–110)
HDLC SERPL-MCNC: 62 MG/DL (ref 40–60)
LDLC SERPL CALC-MCNC: 158.8 MG/DL (ref 0–131)
POTASSIUM SERPL-SCNC: 4.8 MMOL/L (ref 3.5–5.5)
PROT SERPL-MCNC: 6.2 G/DL (ref 6.2–8.2)
SODIUM SERPL-SCNC: 134 MMOL/L (ref 135–145)
TRIGL SERPL-MCNC: 146 MG/DL (ref 0–149)
VLDLC SERPL CALC-MCNC: 29.2 MG/DL (ref 5–40)

## 2020-10-01 ENCOUNTER — HOSPITAL ENCOUNTER (OUTPATIENT)
Dept: HOSPITAL 47 - LABWHC1 | Age: 65
Discharge: HOME | End: 2020-10-01
Attending: FAMILY MEDICINE
Payer: COMMERCIAL

## 2020-10-01 DIAGNOSIS — M81.0: ICD-10-CM

## 2020-10-01 DIAGNOSIS — R53.82: ICD-10-CM

## 2020-10-01 DIAGNOSIS — Z00.00: Primary | ICD-10-CM

## 2020-10-01 DIAGNOSIS — E78.2: ICD-10-CM

## 2020-10-01 LAB
ALBUMIN SERPL-MCNC: 4.2 G/DL (ref 3.8–4.9)
ALBUMIN/GLOB SERPL: 2.1 G/DL (ref 1.6–3.17)
ALP SERPL-CCNC: 75 U/L (ref 41–126)
ALT SERPL-CCNC: 26 U/L (ref 8–44)
ANION GAP SERPL CALC-SCNC: 6.8 MMOL/L (ref 4–12)
AST SERPL-CCNC: 28 U/L (ref 13–35)
BASOPHILS # BLD AUTO: 0.1 K/UL (ref 0–0.2)
BASOPHILS NFR BLD AUTO: 1 %
BUN SERPL-SCNC: 15 MG/DL (ref 9–27)
BUN/CREAT SERPL: 18.75 RATIO (ref 12–20)
CALCIUM SPEC-MCNC: 9.4 MG/DL (ref 8.7–10.3)
CHLORIDE SERPL-SCNC: 103 MMOL/L (ref 96–109)
CHOLEST SERPL-MCNC: 192 MG/DL (ref 0–200)
CO2 SERPL-SCNC: 26.2 MMOL/L (ref 21.6–31.8)
EOSINOPHIL # BLD AUTO: 0.2 K/UL (ref 0–0.7)
EOSINOPHIL NFR BLD AUTO: 3 %
ERYTHROCYTE [DISTWIDTH] IN BLOOD BY AUTOMATED COUNT: 4.58 M/UL (ref 3.8–5.4)
ERYTHROCYTE [DISTWIDTH] IN BLOOD: 12.8 % (ref 11.5–15.5)
GLOBULIN SER CALC-MCNC: 2 G/DL (ref 1.6–3.3)
GLUCOSE SERPL-MCNC: 83 MG/DL (ref 70–110)
HCT VFR BLD AUTO: 41.8 % (ref 34–46)
HDLC SERPL-MCNC: 59 MG/DL (ref 40–60)
HGB BLD-MCNC: 13.4 GM/DL (ref 11.4–16)
LDLC SERPL CALC-MCNC: 117 MG/DL (ref 0–131)
LYMPHOCYTES # SPEC AUTO: 1.9 K/UL (ref 1–4.8)
LYMPHOCYTES NFR SPEC AUTO: 20 %
MCH RBC QN AUTO: 29.3 PG (ref 25–35)
MCHC RBC AUTO-ENTMCNC: 32.1 G/DL (ref 31–37)
MCV RBC AUTO: 91.2 FL (ref 80–100)
MONOCYTES # BLD AUTO: 0.5 K/UL (ref 0–1)
MONOCYTES NFR BLD AUTO: 5 %
NEUTROPHILS # BLD AUTO: 6.8 K/UL (ref 1.3–7.7)
NEUTROPHILS NFR BLD AUTO: 71 %
PLATELET # BLD AUTO: 358 K/UL (ref 150–450)
POTASSIUM SERPL-SCNC: 4.9 MMOL/L (ref 3.5–5.5)
PROT SERPL-MCNC: 6.2 G/DL (ref 6.2–8.2)
SODIUM SERPL-SCNC: 136 MMOL/L (ref 135–145)
T4 FREE SERPL-MCNC: 1.4 NG/DL (ref 0.8–1.8)
TRIGL SERPL-MCNC: 80 MG/DL (ref 0–149)
VIT B12 SERPL-MCNC: 644 PG/ML (ref 200–944)
VLDLC SERPL CALC-MCNC: 16 MG/DL (ref 5–40)
WBC # BLD AUTO: 9.5 K/UL (ref 3.8–10.6)

## 2020-10-01 PROCEDURE — 82607 VITAMIN B-12: CPT

## 2020-10-01 PROCEDURE — 85025 COMPLETE CBC W/AUTO DIFF WBC: CPT

## 2020-10-01 PROCEDURE — 82306 VITAMIN D 25 HYDROXY: CPT

## 2020-10-01 PROCEDURE — 84443 ASSAY THYROID STIM HORMONE: CPT

## 2020-10-01 PROCEDURE — 36415 COLL VENOUS BLD VENIPUNCTURE: CPT

## 2020-10-01 PROCEDURE — 84439 ASSAY OF FREE THYROXINE: CPT

## 2020-10-01 PROCEDURE — 80053 COMPREHEN METABOLIC PANEL: CPT

## 2020-10-01 PROCEDURE — 80061 LIPID PANEL: CPT

## 2020-12-03 ENCOUNTER — HOSPITAL ENCOUNTER (OUTPATIENT)
Dept: HOSPITAL 47 - RADMAMWWP | Age: 65
Discharge: HOME | End: 2020-12-03
Attending: FAMILY MEDICINE
Payer: COMMERCIAL

## 2020-12-03 DIAGNOSIS — Z12.31: Primary | ICD-10-CM

## 2020-12-03 PROCEDURE — 77063 BREAST TOMOSYNTHESIS BI: CPT

## 2020-12-03 PROCEDURE — 77067 SCR MAMMO BI INCL CAD: CPT

## 2020-12-04 NOTE — MM
Reason for exam: screening  (asymptomatic).

Last mammogram was performed 1 year and 3 months ago.



History:

Patient is postmenopausal.

Took other hormone for 2 months.



Physical Findings:

A clinical breast exam by your physician is recommended on an annual basis and 

results should be correlated with mammographic findings.



MG 3D Screening Mammo W/Cad

Bilateral CC and MLO view(s) were taken.

Prior study comparison: September 16, 2019, bilateral MG 3d screening mammo w/cad.

September 14, 2017, bilateral MG screening mammo w CAD.

The breast tissue is heterogeneously dense. This may lower the sensitivity of 

mammography.  There are benign appearing round calcifications bilaterally. There 

is no discrete abnormality.





ASSESSMENT: Benign, BI-RAD 2



RECOMMENDATION:

Routine screening mammogram of both breasts in 1 year.

## 2021-04-02 ENCOUNTER — HOSPITAL ENCOUNTER (OUTPATIENT)
Dept: HOSPITAL 47 - LABWHC1 | Age: 66
Discharge: HOME | End: 2021-04-02
Attending: FAMILY MEDICINE
Payer: COMMERCIAL

## 2021-04-02 DIAGNOSIS — E78.2: ICD-10-CM

## 2021-04-02 DIAGNOSIS — E03.9: Primary | ICD-10-CM

## 2021-04-02 LAB
ALBUMIN SERPL-MCNC: 4.5 G/DL (ref 3.8–4.9)
ALBUMIN/GLOB SERPL: 2.65 G/DL (ref 1.6–3.17)
ALP SERPL-CCNC: 56 U/L (ref 41–126)
ALT SERPL-CCNC: 16 U/L (ref 8–44)
ANION GAP SERPL CALC-SCNC: 7.3 MMOL/L (ref 4–12)
AST SERPL-CCNC: 20 U/L (ref 13–35)
BASOPHILS # BLD AUTO: 0.04 X 10*3/UL (ref 0–0.1)
BASOPHILS NFR BLD AUTO: 0.6 %
BUN SERPL-SCNC: 15 MG/DL (ref 9–27)
BUN/CREAT SERPL: 16.67 RATIO (ref 12–20)
CALCIUM SPEC-MCNC: 9.2 MG/DL (ref 8.7–10.3)
CHLORIDE SERPL-SCNC: 104 MMOL/L (ref 96–109)
CHOLEST SERPL-MCNC: 149 MG/DL (ref 0–200)
CO2 SERPL-SCNC: 27.7 MMOL/L (ref 21.6–31.8)
EOSINOPHIL # BLD AUTO: 0.28 X 10*3/UL (ref 0.04–0.35)
EOSINOPHIL NFR BLD AUTO: 4 %
ERYTHROCYTE [DISTWIDTH] IN BLOOD BY AUTOMATED COUNT: 4.13 X 10*6/UL (ref 4.1–5.2)
ERYTHROCYTE [DISTWIDTH] IN BLOOD: 12.6 % (ref 11.5–14.5)
GLOBULIN SER CALC-MCNC: 1.7 G/DL (ref 1.6–3.3)
GLUCOSE SERPL-MCNC: 95 MG/DL (ref 70–110)
HCT VFR BLD AUTO: 39.4 % (ref 37.2–46.3)
HDLC SERPL-MCNC: 51 MG/DL (ref 40–60)
HGB BLD-MCNC: 12.9 G/DL (ref 12–15)
LDLC SERPL CALC-MCNC: 83 MG/DL (ref 0–131)
LYMPHOCYTES # SPEC AUTO: 1.92 X 10*3/UL (ref 0.9–5)
LYMPHOCYTES NFR SPEC AUTO: 27.2 %
MCH RBC QN AUTO: 31.2 PG (ref 27–32)
MCHC RBC AUTO-ENTMCNC: 32.7 G/DL (ref 32–37)
MCV RBC AUTO: 95.4 FL (ref 80–97)
MONOCYTES # BLD AUTO: 0.56 X 10*3/UL (ref 0.2–1)
MONOCYTES NFR BLD AUTO: 7.9 %
NEUTROPHILS # BLD AUTO: 4.23 X 10*3/UL (ref 1.8–7.7)
NEUTROPHILS NFR BLD AUTO: 59.9 %
PLATELET # BLD AUTO: 318 X 10*3/UL (ref 140–440)
POTASSIUM SERPL-SCNC: 5 MMOL/L (ref 3.5–5.5)
PROT SERPL-MCNC: 6.2 G/DL (ref 6.2–8.2)
SODIUM SERPL-SCNC: 139 MMOL/L (ref 135–145)
T4 FREE SERPL-MCNC: 1.5 NG/DL (ref 0.8–1.8)
TRIGL SERPL-MCNC: 75 MG/DL (ref 0–149)
VLDLC SERPL CALC-MCNC: 15 MG/DL (ref 5–40)
WBC # BLD AUTO: 7.06 X 10*3/UL (ref 4.5–10)

## 2021-04-02 PROCEDURE — 84439 ASSAY OF FREE THYROXINE: CPT

## 2021-04-02 PROCEDURE — 84443 ASSAY THYROID STIM HORMONE: CPT

## 2021-04-02 PROCEDURE — 36415 COLL VENOUS BLD VENIPUNCTURE: CPT

## 2021-04-02 PROCEDURE — 85025 COMPLETE CBC W/AUTO DIFF WBC: CPT

## 2021-04-02 PROCEDURE — 80061 LIPID PANEL: CPT

## 2021-04-02 PROCEDURE — 80053 COMPREHEN METABOLIC PANEL: CPT

## 2021-07-12 ENCOUNTER — HOSPITAL ENCOUNTER (OUTPATIENT)
Dept: HOSPITAL 47 - LABWHC1 | Age: 66
Discharge: HOME | End: 2021-07-12
Attending: FAMILY MEDICINE
Payer: MEDICARE

## 2021-07-12 DIAGNOSIS — K58.9: ICD-10-CM

## 2021-07-12 DIAGNOSIS — R41.3: ICD-10-CM

## 2021-07-12 DIAGNOSIS — E03.9: ICD-10-CM

## 2021-07-12 DIAGNOSIS — E78.2: Primary | ICD-10-CM

## 2021-07-12 LAB
ALBUMIN SERPL-MCNC: 4.3 G/DL (ref 3.8–4.9)
ALBUMIN/GLOB SERPL: 2.15 G/DL (ref 1.6–3.17)
ALP SERPL-CCNC: 57 U/L (ref 41–126)
ALT SERPL-CCNC: 19 U/L (ref 8–44)
ANION GAP SERPL CALC-SCNC: 2.4 MMOL/L (ref 4–12)
AST SERPL-CCNC: 26 U/L (ref 13–35)
BASOPHILS # BLD AUTO: 0.05 X 10*3/UL (ref 0–0.1)
BASOPHILS NFR BLD AUTO: 0.6 %
BUN SERPL-SCNC: 19 MG/DL (ref 9–27)
BUN/CREAT SERPL: 27.14 RATIO (ref 12–20)
CALCIUM SPEC-MCNC: 9.1 MG/DL (ref 8.7–10.3)
CHLORIDE SERPL-SCNC: 100 MMOL/L (ref 96–109)
CHOLEST SERPL-MCNC: 165 MG/DL (ref 0–200)
CK SERPL-CCNC: 50 U/L (ref 26–186)
CO2 SERPL-SCNC: 31.6 MMOL/L (ref 21.6–31.8)
EOSINOPHIL # BLD AUTO: 0.19 X 10*3/UL (ref 0.04–0.35)
EOSINOPHIL NFR BLD AUTO: 2.4 %
ERYTHROCYTE [DISTWIDTH] IN BLOOD BY AUTOMATED COUNT: 4.25 X 10*6/UL (ref 4.1–5.2)
ERYTHROCYTE [DISTWIDTH] IN BLOOD: 12.7 % (ref 11.5–14.5)
GLOBULIN SER CALC-MCNC: 2 G/DL (ref 1.6–3.3)
GLUCOSE SERPL-MCNC: 90 MG/DL (ref 70–110)
HCT VFR BLD AUTO: 40 % (ref 37.2–46.3)
HDLC SERPL-MCNC: 59 MG/DL (ref 40–60)
HGB BLD-MCNC: 13.3 G/DL (ref 12–15)
LDLC SERPL CALC-MCNC: 87.6 MG/DL (ref 0–131)
LYMPHOCYTES # SPEC AUTO: 1.97 X 10*3/UL (ref 0.9–5)
LYMPHOCYTES NFR SPEC AUTO: 25.1 %
MCH RBC QN AUTO: 31.3 PG (ref 27–32)
MCHC RBC AUTO-ENTMCNC: 33.3 G/DL (ref 32–37)
MCV RBC AUTO: 94.1 FL (ref 80–97)
MONOCYTES # BLD AUTO: 0.64 X 10*3/UL (ref 0.2–1)
MONOCYTES NFR BLD AUTO: 8.2 %
NEUTROPHILS # BLD AUTO: 4.97 X 10*3/UL (ref 1.8–7.7)
NEUTROPHILS NFR BLD AUTO: 63.4 %
PH UR: 7.5 [PH] (ref 5–8)
PLATELET # BLD AUTO: 330 X 10*3/UL (ref 140–440)
POTASSIUM SERPL-SCNC: 4.9 MMOL/L (ref 3.5–5.5)
PROT SERPL-MCNC: 6.3 G/DL (ref 6.2–8.2)
SODIUM SERPL-SCNC: 134 MMOL/L (ref 135–145)
SP GR UR: 1.01 (ref 1–1.03)
T4 FREE SERPL-MCNC: 1.3 NG/DL (ref 0.8–1.8)
TRIGL SERPL-MCNC: 92 MG/DL (ref 0–149)
UROBILINOGEN UR QL STRIP: <2 MG/DL (ref ?–2)
VIT B12 SERPL-MCNC: 1200 PG/ML (ref 200–944)
VLDLC SERPL CALC-MCNC: 18.4 MG/DL (ref 5–40)
WBC # BLD AUTO: 7.84 X 10*3/UL (ref 4.5–10)

## 2021-07-12 PROCEDURE — 80061 LIPID PANEL: CPT

## 2021-07-12 PROCEDURE — 82306 VITAMIN D 25 HYDROXY: CPT

## 2021-07-12 PROCEDURE — 36415 COLL VENOUS BLD VENIPUNCTURE: CPT

## 2021-07-12 PROCEDURE — 82607 VITAMIN B-12: CPT

## 2021-07-12 PROCEDURE — 81003 URINALYSIS AUTO W/O SCOPE: CPT

## 2021-07-12 PROCEDURE — 80053 COMPREHEN METABOLIC PANEL: CPT

## 2021-07-12 PROCEDURE — 82550 ASSAY OF CK (CPK): CPT

## 2021-07-12 PROCEDURE — 84439 ASSAY OF FREE THYROXINE: CPT

## 2021-07-12 PROCEDURE — 84443 ASSAY THYROID STIM HORMONE: CPT

## 2021-07-12 PROCEDURE — 85025 COMPLETE CBC W/AUTO DIFF WBC: CPT

## 2021-08-04 ENCOUNTER — HOSPITAL ENCOUNTER (OUTPATIENT)
Dept: HOSPITAL 47 - RADMRIMAIN | Age: 66
Discharge: HOME | End: 2021-08-04
Attending: FAMILY MEDICINE
Payer: MEDICARE

## 2021-08-04 DIAGNOSIS — G31.9: Primary | ICD-10-CM

## 2021-08-04 DIAGNOSIS — I67.82: ICD-10-CM

## 2021-08-04 PROCEDURE — 70553 MRI BRAIN STEM W/O & W/DYE: CPT

## 2021-08-04 NOTE — MR
EXAMINATION TYPE: MR brain wo/w con

 

DATE OF EXAM: 8/4/2021

 

COMPARISON: NONE

 

HISTORY: Memory loss, Vascular Dementia

 

TECHNIQUE: 

Multiplanar, multisequence images of the brain and brainstem is performed without and with IV contras
t, utilizing 6 mL intravenous Gadavist .

 

FINDINGS: Diffusion weighted images demonstrate no evidence of a recent infarct or other diffusion ab
normality.  There is mild to moderate ventricular and sulcal prominence. Scattered foci of T2 hyperin
tensity are seen throughout the white matter bilaterally. Approximately 30 scattered lesions are seen
. Lesions are nonspecific in appearance and distribution.

 

Midline structures demonstrate normal morphology.  The craniocervical junction appears within normal 
limits.  Post contrast images demonstrate no abnormal enhancement. The dural venous sinuses appear pa
tent. The visualized sinuses are clear and the globes are intact.

 

IMPRESSION: Mild to moderate diffuse cerebral atrophy and chronic small vessel ischemic change.

## 2022-02-02 ENCOUNTER — HOSPITAL ENCOUNTER (OUTPATIENT)
Dept: HOSPITAL 47 - LABWHC1 | Age: 67
Discharge: HOME | End: 2022-02-02
Attending: FAMILY MEDICINE
Payer: MEDICARE

## 2022-02-02 DIAGNOSIS — E78.2: Primary | ICD-10-CM

## 2022-02-02 DIAGNOSIS — R41.3: ICD-10-CM

## 2022-02-02 DIAGNOSIS — E03.9: ICD-10-CM

## 2022-02-02 LAB
ALBUMIN SERPL-MCNC: 4.1 G/DL (ref 3.8–4.9)
ALBUMIN/GLOB SERPL: 1.82 G/DL (ref 1.6–3.17)
ALP SERPL-CCNC: 53 U/L (ref 41–126)
ALT SERPL-CCNC: 18 U/L (ref 8–44)
ANION GAP SERPL CALC-SCNC: 10.9 MMOL/L (ref 10–18)
AST SERPL-CCNC: 22 U/L (ref 13–35)
BASOPHILS # BLD AUTO: 0.05 X 10*3/UL (ref 0–0.1)
BASOPHILS NFR BLD AUTO: 0.7 %
BUN SERPL-SCNC: 11.6 MG/DL (ref 9–27)
BUN/CREAT SERPL: 14.66 RATIO (ref 12–20)
CALCIUM SPEC-MCNC: 9.2 MG/DL (ref 8.7–10.3)
CHLORIDE SERPL-SCNC: 102 MMOL/L (ref 96–109)
CHOLEST SERPL-MCNC: 172 MG/DL (ref 0–200)
CK SERPL-CCNC: 58 U/L (ref 26–186)
CO2 SERPL-SCNC: 23.4 MMOL/L (ref 20–27.5)
EOSINOPHIL # BLD AUTO: 0.22 X 10*3/UL (ref 0.04–0.35)
EOSINOPHIL NFR BLD AUTO: 3 %
ERYTHROCYTE [DISTWIDTH] IN BLOOD BY AUTOMATED COUNT: 4.24 X 10*6/UL (ref 4.1–5.2)
ERYTHROCYTE [DISTWIDTH] IN BLOOD: 12.7 % (ref 11.5–14.5)
GLOBULIN SER CALC-MCNC: 2.2 G/DL (ref 1.6–3.3)
GLUCOSE SERPL-MCNC: 85 MG/DL (ref 70–110)
HCT VFR BLD AUTO: 39.9 % (ref 37.2–46.3)
HDLC SERPL-MCNC: 58.8 MG/DL (ref 40–60)
HGB BLD-MCNC: 12.9 G/DL (ref 12–15)
LDLC SERPL CALC-MCNC: 97.3 MG/DL (ref 0–131)
LYMPHOCYTES # SPEC AUTO: 1.84 X 10*3/UL (ref 0.9–5)
LYMPHOCYTES NFR SPEC AUTO: 25.2 %
MCH RBC QN AUTO: 30.4 PG (ref 27–32)
MCHC RBC AUTO-ENTMCNC: 32.3 G/DL (ref 32–37)
MCV RBC AUTO: 94.1 FL (ref 80–97)
MONOCYTES # BLD AUTO: 0.59 X 10*3/UL (ref 0.2–1)
MONOCYTES NFR BLD AUTO: 8.1 %
NEUTROPHILS # BLD AUTO: 4.58 X 10*3/UL (ref 1.8–7.7)
NEUTROPHILS NFR BLD AUTO: 62.7 %
PLATELET # BLD AUTO: 323 X 10*3/UL (ref 140–440)
POTASSIUM SERPL-SCNC: 4.4 MMOL/L (ref 3.5–5.5)
PROT SERPL-MCNC: 6.3 G/DL (ref 6.2–8.2)
SODIUM SERPL-SCNC: 136 MMOL/L (ref 135–145)
T4 FREE SERPL-MCNC: 1.76 NG/DL (ref 0.8–1.8)
TRIGL SERPL-MCNC: 79.3 MG/DL (ref 0–149)
URATE SERPL-MCNC: 2.7 MG/DL (ref 2.9–7.7)
VLDLC SERPL CALC-MCNC: 15.86 MG/DL (ref 5–40)
WBC # BLD AUTO: 7.3 X 10*3/UL (ref 4.5–10)

## 2022-02-02 PROCEDURE — 84439 ASSAY OF FREE THYROXINE: CPT

## 2022-02-02 PROCEDURE — 85025 COMPLETE CBC W/AUTO DIFF WBC: CPT

## 2022-02-02 PROCEDURE — 80061 LIPID PANEL: CPT

## 2022-02-02 PROCEDURE — 82550 ASSAY OF CK (CPK): CPT

## 2022-02-02 PROCEDURE — 84550 ASSAY OF BLOOD/URIC ACID: CPT

## 2022-02-02 PROCEDURE — 36415 COLL VENOUS BLD VENIPUNCTURE: CPT

## 2022-02-02 PROCEDURE — 84443 ASSAY THYROID STIM HORMONE: CPT

## 2022-02-02 PROCEDURE — 80053 COMPREHEN METABOLIC PANEL: CPT

## 2022-04-14 ENCOUNTER — HOSPITAL ENCOUNTER (OUTPATIENT)
Dept: HOSPITAL 47 - RADBDWWP | Age: 67
Discharge: HOME | End: 2022-04-14
Attending: FAMILY MEDICINE
Payer: MEDICARE

## 2022-04-14 DIAGNOSIS — M85.851: ICD-10-CM

## 2022-04-14 DIAGNOSIS — Z78.0: ICD-10-CM

## 2022-04-14 DIAGNOSIS — M85.852: Primary | ICD-10-CM

## 2022-04-14 PROCEDURE — 77080 DXA BONE DENSITY AXIAL: CPT

## 2022-04-15 NOTE — BD
EXAMINATION TYPE: Axial Bone Density

 

DATE OF EXAM: 4/14/2022

 

COMPARISON: The 20 19th

 

CLINICAL HISTORY: 66 years year old Female.  ICD-10 CODE: Z78.0 POST MENOPAUSAL WITHOUT HRT, M89.9 DI
SORDER OF BONE

 

Height:  5 FT 

Weight:  125

 

FRAX RISK QUESTIONS:

Alcohol (3 or more units per day):  NO

Family History (Parent hip fracture):  NO

Glucocorticoids (More than 3mos):  NO

           (Ex: prednisone, prednisolone, methylprednisolone, dexamethasone, and hydrocortisone).    
     

History of Fracture in Adulthood: YES

Secondary Osteoporosis:

  1.  Type 1 Diabetes: NO

  2.  Hyperthyroidism: NO

  3.  Menopause before 45: NO

  4.  Malnutrition: NO

  5.  Chronic liver disease: NO

Rheumatoid Arthritis: NO

Current Tobacco Use: NO

 

RISK FACTORS 

HISTORY OF: 

Surgery to Spine/Hip(right/left)/Wrist (right/left): NO

Family History of Osteoporosis: NO

Active: YES

Diet low in dairy products/other sources of calcium:  NO

Postmenopausal woman: YES

Take estrogen and/or progesterone medications: NO

Lost more than 2 inches in height since high school: YES

Frequent falls: NO

Poor Health: GOOD

Hyperparathyroidism: NO

Adrenal Insufficiency: NO

 

MEDICATIONS: 

Additional Medications: PROZAC, BLOOD PRESSURE MEDS, NEXIUM, XANAX, LIPITOR, RALOXIFENE, INDUROL, 

SYNTHROID,TOPAMAX, CHOLESTEROL MEDS

Additional History: LAURA CARPAL  TUNNEL SURG: BEEN ON RALOXIFENE FOR TWO YEARS FOR OSTEOPOROSIS

 

 

EXAM MEASUREMENTS: 

Bone mineral densitometry was performed using the [employer] System.

Bone mineral density as measured about the Lumbar spine is:  

----- L1-L4(G/cm2): 1.221

T Score Values are as follows:

----- L1: -0.4

----- L2: 0.6

----- L3: 0.9

----- L4: 0.1

----- L1-L4: 0.3

Bone mineral density has: INCREASED  1.5 % SINCE STUDY OF 2019

 

Bone mineral density about the R hip (g/cm2): 0.791

Bone mineral density about the L hip (g/cm2): 0.849

T Score values are as follows:

-----R Neck: -1.8

-----L Neck: -1.4

-----R Total: -1.4

-----L Total: -1.4

Bone mineral density has: decreased  -0.5 % since study 2019

 

 

FRAX%s: The graph provided illustrates a 16.5 % for a major osteoporotic fx and a 2.3 % for the hips 
probability for fx in 10 years time.

 

IMPRESSION:

Osteopenia (T Score between -2.5 and -1).

 

There is slightly increased risk of fracture and the patient may be considered 

for treatment. 

 

Re-Screen 2-5 years.

 

NOTE:  T-SCORE=SD OF THE YOUNG ADULT MEAN.

## 2022-08-30 ENCOUNTER — HOSPITAL ENCOUNTER (OUTPATIENT)
Dept: HOSPITAL 47 - LABWHC1 | Age: 67
Discharge: HOME | End: 2022-08-30
Attending: FAMILY MEDICINE
Payer: MEDICARE

## 2022-08-30 DIAGNOSIS — E55.9: ICD-10-CM

## 2022-08-30 DIAGNOSIS — E03.9: ICD-10-CM

## 2022-08-30 DIAGNOSIS — E53.8: ICD-10-CM

## 2022-08-30 DIAGNOSIS — E78.2: Primary | ICD-10-CM

## 2022-08-30 LAB
ALBUMIN SERPL-MCNC: 4.2 G/DL (ref 3.8–4.9)
ALBUMIN/GLOB SERPL: 1.91 G/DL (ref 1.6–3.17)
ALP SERPL-CCNC: 62 U/L (ref 41–126)
ALT SERPL-CCNC: 15 U/L (ref 8–44)
ANION GAP SERPL CALC-SCNC: 8.5 MMOL/L (ref 10–18)
AST SERPL-CCNC: 23 U/L (ref 13–35)
BASOPHILS # BLD AUTO: 0.06 X 10*3/UL (ref 0–0.1)
BASOPHILS NFR BLD AUTO: 0.8 %
BUN SERPL-SCNC: 15.8 MG/DL (ref 9–27)
BUN/CREAT SERPL: 19.75 RATIO (ref 12–20)
CALCIUM SPEC-MCNC: 9.2 MG/DL (ref 8.7–10.3)
CHLORIDE SERPL-SCNC: 101 MMOL/L (ref 96–109)
CHOLEST SERPL-MCNC: 172 MG/DL (ref 0–200)
CO2 SERPL-SCNC: 26.5 MMOL/L (ref 20–27.5)
EOSINOPHIL # BLD AUTO: 0.21 X 10*3/UL (ref 0.04–0.35)
EOSINOPHIL NFR BLD AUTO: 2.8 %
ERYTHROCYTE [DISTWIDTH] IN BLOOD BY AUTOMATED COUNT: 4.27 X 10*6/UL (ref 4.1–5.2)
ERYTHROCYTE [DISTWIDTH] IN BLOOD: 12.7 % (ref 11.5–14.5)
GLOBULIN SER CALC-MCNC: 2.2 G/DL (ref 1.6–3.3)
GLUCOSE SERPL-MCNC: 88 MG/DL (ref 70–110)
HCT VFR BLD AUTO: 40 % (ref 37.2–46.3)
HDLC SERPL-MCNC: 56.3 MG/DL (ref 40–60)
HGB BLD-MCNC: 13.1 G/DL (ref 12–15)
IMM GRANULOCYTES BLD QL AUTO: 0.3 %
LDLC SERPL CALC-MCNC: 97.1 MG/DL (ref 0–131)
LYMPHOCYTES # SPEC AUTO: 1.94 X 10*3/UL (ref 0.9–5)
LYMPHOCYTES NFR SPEC AUTO: 25.9 %
MCH RBC QN AUTO: 30.7 PG (ref 27–32)
MCHC RBC AUTO-ENTMCNC: 32.8 G/DL (ref 32–37)
MCV RBC AUTO: 93.7 FL (ref 80–97)
MONOCYTES # BLD AUTO: 0.59 X 10*3/UL (ref 0.2–1)
MONOCYTES NFR BLD AUTO: 7.9 %
NEUTROPHILS # BLD AUTO: 4.67 X 10*3/UL (ref 1.8–7.7)
NEUTROPHILS NFR BLD AUTO: 62.3 %
NRBC BLD AUTO-RTO: 0 /100 WBCS (ref 0–0)
PLATELET # BLD AUTO: 307 X 10*3/UL (ref 140–440)
POTASSIUM SERPL-SCNC: 4.4 MMOL/L (ref 3.5–5.5)
PROT SERPL-MCNC: 6.4 G/DL (ref 6.2–8.2)
SODIUM SERPL-SCNC: 136 MMOL/L (ref 135–145)
T4 FREE SERPL-MCNC: 1.7 NG/DL (ref 0.8–1.8)
TRIGL SERPL-MCNC: 92.8 MG/DL (ref 0–149)
VIT B12 SERPL-MCNC: 917 PG/ML (ref 200–944)
VLDLC SERPL CALC-MCNC: 18.56 MG/DL (ref 5–40)
WBC # BLD AUTO: 7.49 X 10*3/UL (ref 4.5–10)

## 2022-08-30 PROCEDURE — 36415 COLL VENOUS BLD VENIPUNCTURE: CPT

## 2022-08-30 PROCEDURE — 80061 LIPID PANEL: CPT

## 2022-08-30 PROCEDURE — 85025 COMPLETE CBC W/AUTO DIFF WBC: CPT

## 2022-08-30 PROCEDURE — 84439 ASSAY OF FREE THYROXINE: CPT

## 2022-08-30 PROCEDURE — 82306 VITAMIN D 25 HYDROXY: CPT

## 2022-08-30 PROCEDURE — 82607 VITAMIN B-12: CPT

## 2022-08-30 PROCEDURE — 80053 COMPREHEN METABOLIC PANEL: CPT

## 2022-08-30 PROCEDURE — 84443 ASSAY THYROID STIM HORMONE: CPT

## 2023-04-19 ENCOUNTER — HOSPITAL ENCOUNTER (OUTPATIENT)
Dept: HOSPITAL 47 - RADMAMWWP | Age: 68
Discharge: HOME | End: 2023-04-19
Attending: FAMILY MEDICINE
Payer: MEDICARE

## 2023-04-19 DIAGNOSIS — E55.9: ICD-10-CM

## 2023-04-19 DIAGNOSIS — E03.9: ICD-10-CM

## 2023-04-19 DIAGNOSIS — R73.9: ICD-10-CM

## 2023-04-19 DIAGNOSIS — R53.82: ICD-10-CM

## 2023-04-19 DIAGNOSIS — E78.2: ICD-10-CM

## 2023-04-19 DIAGNOSIS — Z78.0: ICD-10-CM

## 2023-04-19 DIAGNOSIS — Z12.31: Primary | ICD-10-CM

## 2023-04-19 DIAGNOSIS — L50.9: ICD-10-CM

## 2023-04-19 LAB
ALBUMIN SERPL-MCNC: 4.4 G/DL (ref 3.8–4.9)
ALBUMIN/GLOB SERPL: 2.2 G/DL (ref 1.6–3.17)
ALP SERPL-CCNC: 60 U/L (ref 41–126)
ALT SERPL-CCNC: 17 U/L (ref 8–44)
ANION GAP SERPL CALC-SCNC: 9.2 MMOL/L (ref 10–18)
AST SERPL-CCNC: 22 U/L (ref 13–35)
BASOPHILS # BLD AUTO: 0.06 X 10*3/UL (ref 0–0.1)
BASOPHILS NFR BLD AUTO: 0.8 %
BUN SERPL-SCNC: 12.9 MG/DL (ref 9–27)
BUN/CREAT SERPL: 16.13 RATIO (ref 12–20)
CALCIUM SPEC-MCNC: 9.5 MG/DL (ref 8.7–10.3)
CHLORIDE SERPL-SCNC: 101 MMOL/L (ref 96–109)
CHOLEST SERPL-MCNC: 181 MG/DL (ref 0–200)
CO2 SERPL-SCNC: 27.8 MMOL/L (ref 20–27.5)
EOSINOPHIL # BLD AUTO: 0.22 X 10*3/UL (ref 0.04–0.35)
EOSINOPHIL NFR BLD AUTO: 2.8 %
ERYTHROCYTE [DISTWIDTH] IN BLOOD BY AUTOMATED COUNT: 4.21 X 10*6/UL (ref 4.1–5.2)
ERYTHROCYTE [DISTWIDTH] IN BLOOD: 12.4 % (ref 11.5–14.5)
GLOBULIN SER CALC-MCNC: 2 G/DL (ref 1.6–3.3)
GLUCOSE SERPL-MCNC: 88 MG/DL (ref 70–110)
HCT VFR BLD AUTO: 39.4 % (ref 37.2–46.3)
HDLC SERPL-MCNC: 64.6 MG/DL (ref 40–60)
HGB BLD-MCNC: 13.2 G/DL (ref 12–15)
IMM GRANULOCYTES BLD QL AUTO: 0.3 %
IRON SERPL-MCNC: 81 UG/DL (ref 50–170)
LDLC SERPL CALC-MCNC: 103.1 MG/DL (ref 0–131)
LYMPHOCYTES # SPEC AUTO: 1.93 X 10*3/UL (ref 0.9–5)
LYMPHOCYTES NFR SPEC AUTO: 24.8 %
MCH RBC QN AUTO: 31.4 PG (ref 27–32)
MCHC RBC AUTO-ENTMCNC: 33.5 G/DL (ref 32–37)
MCV RBC AUTO: 93.6 FL (ref 80–97)
MONOCYTES # BLD AUTO: 0.58 X 10*3/UL (ref 0.2–1)
MONOCYTES NFR BLD AUTO: 7.5 %
NEUTROPHILS # BLD AUTO: 4.96 X 10*3/UL (ref 1.8–7.7)
NEUTROPHILS NFR BLD AUTO: 63.8 %
NRBC BLD AUTO-RTO: 0 /100 WBCS (ref 0–0)
PLATELET # BLD AUTO: 323 X 10*3/UL (ref 140–440)
POTASSIUM SERPL-SCNC: 4.5 MMOL/L (ref 3.5–5.5)
PROT SERPL-MCNC: 6.4 G/DL (ref 6.2–8.2)
SODIUM SERPL-SCNC: 138 MMOL/L (ref 135–145)
T4 FREE SERPL-MCNC: 1.86 NG/DL (ref 0.8–1.8)
TIBC SERPL-MCNC: 403 UG/DL (ref 228–460)
TRIGL SERPL-MCNC: 66.5 MG/DL (ref 0–149)
VIT B12 SERPL-MCNC: 1007 PG/ML (ref 200–944)
VLDLC SERPL CALC-MCNC: 13.3 MG/DL (ref 5–40)
WBC # BLD AUTO: 7.77 X 10*3/UL (ref 4.5–10)

## 2023-04-19 PROCEDURE — 77063 BREAST TOMOSYNTHESIS BI: CPT

## 2023-04-19 PROCEDURE — 36415 COLL VENOUS BLD VENIPUNCTURE: CPT

## 2023-04-19 PROCEDURE — 83520 IMMUNOASSAY QUANT NOS NONAB: CPT

## 2023-04-19 PROCEDURE — 80061 LIPID PANEL: CPT

## 2023-04-19 PROCEDURE — 85025 COMPLETE CBC W/AUTO DIFF WBC: CPT

## 2023-04-19 PROCEDURE — 83540 ASSAY OF IRON: CPT

## 2023-04-19 PROCEDURE — 82306 VITAMIN D 25 HYDROXY: CPT

## 2023-04-19 PROCEDURE — 83036 HEMOGLOBIN GLYCOSYLATED A1C: CPT

## 2023-04-19 PROCEDURE — 84439 ASSAY OF FREE THYROXINE: CPT

## 2023-04-19 PROCEDURE — 80053 COMPREHEN METABOLIC PANEL: CPT

## 2023-04-19 PROCEDURE — 82607 VITAMIN B-12: CPT

## 2023-04-19 PROCEDURE — 84443 ASSAY THYROID STIM HORMONE: CPT

## 2023-04-19 PROCEDURE — 77067 SCR MAMMO BI INCL CAD: CPT

## 2023-04-19 PROCEDURE — 83550 IRON BINDING TEST: CPT

## 2023-04-20 NOTE — MM
Reason for Exam: Screening  (asymptomatic). 

Last screening mammogram was performed 12 month(s) ago.





Patient History: 

Menarche at age 14. First Full-Term Pregnancy at age 17. Postmenopausal. Patient has history of

breast feeding. 





Risk Values: 

Ina 5 year model risk: 1.2%.

NCI Lifetime model risk: 4.4%.





Prior Study Comparison: 

9/16/2019 Bilateral Screening Mammogram, [facility]. 12/3/2020 Bilateral Screening Mammogram, [facility]. 4/5/2022

Bilateral Screening Mammogram, [facility]. 





Tissue Density: 

The breast tissue is heterogeneously dense. This may lower the sensitivity of mammography.





Findings: 

Analyzed By CAD. 

There is no suspicious group of microcalcifications or new suspicious mass in either breast.

Benign-appearing round calcifications within both breasts. 





Overall Assessment: Benign, BI-RAD 2





Management: 

Screening Mammogram of both breasts in 1 year.

A clinical breast exam by your physician is recommended on an annual basis and results should be

correlated with mammographic findings.



Electronically signed and approved by: Ash Cruz D.O.

## 2023-09-07 ENCOUNTER — HOSPITAL ENCOUNTER (OUTPATIENT)
Dept: HOSPITAL 47 - LABWHC1 | Age: 68
Discharge: HOME | End: 2023-09-07
Attending: FAMILY MEDICINE
Payer: MEDICARE

## 2023-09-07 DIAGNOSIS — E61.1: ICD-10-CM

## 2023-09-07 DIAGNOSIS — J30.89: ICD-10-CM

## 2023-09-07 DIAGNOSIS — E55.9: Primary | ICD-10-CM

## 2023-09-07 DIAGNOSIS — R53.82: ICD-10-CM

## 2023-09-07 DIAGNOSIS — E78.1: ICD-10-CM

## 2023-09-07 DIAGNOSIS — E03.9: ICD-10-CM

## 2023-09-07 DIAGNOSIS — E73.9: ICD-10-CM

## 2023-09-07 DIAGNOSIS — G43.909: ICD-10-CM

## 2023-09-07 LAB
ALBUMIN SERPL-MCNC: 4.3 D/DL (ref 3.8–4.9)
ALBUMIN/GLOB SERPL: 2.39 RATIO (ref 1.6–3.17)
ALP SERPL-CCNC: 61 U/L (ref 41–126)
ALT SERPL-CCNC: 22 U/L (ref 8–44)
ANION GAP SERPL CALC-SCNC: 8.4 MMOL/L (ref 4–12)
AST SERPL-CCNC: 22 U/L (ref 13–35)
BASOPHILS # BLD AUTO: 0.07 X 10*3/UL (ref 0–0.1)
BASOPHILS NFR BLD AUTO: 0.9 %
BUN SERPL-SCNC: 10.3 MG/DL (ref 9–27)
BUN/CREAT SERPL: 12.88 RATIO (ref 12–20)
CALCIUM SPEC-MCNC: 9.7 MG/DL (ref 8.7–10.3)
CHLORIDE SERPL-SCNC: 102 MMOL/L (ref 96–109)
CHOLEST SERPL-MCNC: 162 MG/DL (ref 0–200)
CK SERPL-CCNC: 55 U/L (ref 26–186)
CO2 SERPL-SCNC: 27.6 MMOL/L (ref 21.6–31.8)
EOSINOPHIL # BLD AUTO: 0.27 X 10*3/UL (ref 0.04–0.35)
EOSINOPHIL NFR BLD AUTO: 3.6 %
ERYTHROCYTE [DISTWIDTH] IN BLOOD BY AUTOMATED COUNT: 4.31 X 10*6/UL (ref 4.1–5.2)
ERYTHROCYTE [DISTWIDTH] IN BLOOD: 12.6 % (ref 11.5–14.5)
GLOBULIN SER CALC-MCNC: 1.8 D/DL (ref 1.6–3.3)
GLUCOSE SERPL-MCNC: 87 MG/DL (ref 70–110)
HCT VFR BLD AUTO: 40.6 % (ref 37.2–46.3)
HDLC SERPL-MCNC: 54.7 MG/DL (ref 40–60)
HGB BLD-MCNC: 13.3 D/DL (ref 12–15)
IMM GRANULOCYTES BLD QL AUTO: 0.4 %
IRON SERPL-MCNC: 110 UG/DL (ref 50–170)
LDLC SERPL CALC-MCNC: 90.2 MG/DL (ref 0–131)
LYMPHOCYTES # SPEC AUTO: 1.79 X 10*3/UL (ref 0.9–5)
LYMPHOCYTES NFR SPEC AUTO: 23.9 %
MCH RBC QN AUTO: 30.9 PG (ref 27–32)
MCHC RBC AUTO-ENTMCNC: 32.8 D/DL (ref 32–37)
MCV RBC AUTO: 94.2 FL (ref 80–97)
MONOCYTES # BLD AUTO: 0.62 X 10*3/UL (ref 0.2–1)
MONOCYTES NFR BLD AUTO: 8.3 %
NEUTROPHILS # BLD AUTO: 4.71 X 10*3/UL (ref 1.8–7.7)
NEUTROPHILS NFR BLD AUTO: 62.9 %
NRBC BLD AUTO-RTO: 0 X 10*3/UL (ref 0–0.01)
PLATELET # BLD AUTO: 321 X 10*3/UL (ref 140–440)
POTASSIUM SERPL-SCNC: 5.9 MMOL/L (ref 3.5–5.5)
PROT SERPL-MCNC: 6.1 D/DL (ref 6.2–8.2)
SODIUM SERPL-SCNC: 138 MMOL/L (ref 135–145)
TIBC SERPL-MCNC: 370 UG/DL (ref 228–460)
TRIGL SERPL-MCNC: 85.5 MG/DL (ref 0–149)
VIT B12 SERPL-MCNC: 1306 PG/ML (ref 200–944)
VLDLC SERPL CALC-MCNC: 17.1 MG/DL (ref 5–40)
WBC # BLD AUTO: 7.49 X 10*3/UL (ref 4.5–10)

## 2023-09-07 PROCEDURE — 80053 COMPREHEN METABOLIC PANEL: CPT

## 2023-09-07 PROCEDURE — 82607 VITAMIN B-12: CPT

## 2023-09-07 PROCEDURE — 83550 IRON BINDING TEST: CPT

## 2023-09-07 PROCEDURE — 85025 COMPLETE CBC W/AUTO DIFF WBC: CPT

## 2023-09-07 PROCEDURE — 82306 VITAMIN D 25 HYDROXY: CPT

## 2023-09-07 PROCEDURE — 82550 ASSAY OF CK (CPK): CPT

## 2023-09-07 PROCEDURE — 83540 ASSAY OF IRON: CPT

## 2023-09-07 PROCEDURE — 82533 TOTAL CORTISOL: CPT

## 2023-09-07 PROCEDURE — 84480 ASSAY TRIIODOTHYRONINE (T3): CPT

## 2023-09-07 PROCEDURE — 83036 HEMOGLOBIN GLYCOSYLATED A1C: CPT

## 2023-09-07 PROCEDURE — 80061 LIPID PANEL: CPT

## 2023-09-07 PROCEDURE — 84443 ASSAY THYROID STIM HORMONE: CPT

## 2023-09-07 PROCEDURE — 36415 COLL VENOUS BLD VENIPUNCTURE: CPT

## 2024-03-13 ENCOUNTER — HOSPITAL ENCOUNTER (OUTPATIENT)
Dept: HOSPITAL 47 - RADNMMAIN | Age: 69
Discharge: HOME | End: 2024-03-13
Attending: FAMILY MEDICINE
Payer: MEDICARE

## 2024-03-13 DIAGNOSIS — R07.9: Primary | ICD-10-CM

## 2024-03-13 DIAGNOSIS — E78.00: ICD-10-CM

## 2024-03-13 PROCEDURE — 93351 STRESS TTE COMPLETE: CPT

## 2024-03-13 NOTE — CA
Stress Echo 

 Report 

 

 Loni Clark 

 

 Age:    68     Gender:    F 

 

 :    1955 

 

 Exam Date:     2024 08:57 

 

 Exam Location: [facility] 

 

 Ht (in):     61     Wt (lb):    133 

 

 Ordering Physician:        Edith Guerrero MD 

 

 Referring Physician:       ,, 

 

 Technician:                Heather Torre RDCS 

 Technologist 

 MRN:    [id number] 

 

 Procedure CPT: 

 

 Indication:        R07.9 CHEST PAIN 

 

 ICD-9 Codes: 

 

 Rhythm: 

 

 Patient History:                      ELEVATED CHOLESTEROL LEVELS,  

                                       FAMILY HX OF HEART DISEASE 

 

 Cardiac Medications: 

 

 Medications in past 24 hours: 

 

 Contrast: 

 

 Stress Results 

 

 Protocol:     Enzo 

 

 Total dose(mL): 

 

 Exercise Duration (min:sec):          8:22 

 Max ST Depression (mm): 

 Angina Score: 

 Agustin Score: 

 METS:    10.1 

 

 Resting HR:      76        Resting BP:     153    /   73 

 

 Peak HR:     138       Peak BP:     164   /   78 

 

 Max Predicted HR:       153 

 

 90     % Max Predicted HR 

 

 Target HR:     130 

 

 Double Product:       [id number] 

 

 Stress Summary: 

 

 BP Response: 

 

 Reason for Termination:        MAX EXERTION/TARGET HR 

 

 Cardiac Symptoms:              NO SYMPTOMS 

 

 ECG Analysis 

 

 Resting ECG: 

 

 Stress ECG: 

 

 Arrhythmia: 

 Echo Analysis 

 

 Resting Echo: 

 

 Peak Echo Analysis: 

 

 MEASUREMENTS (Male/Female) Normal Values 

 

 

 

 

 CONCLUSIONS 

 Excellent exercise tolerance 

 Normal EKG and echo in response to exercise 

 

 Dr. John Mendez MD 

 (Electronically Signed) 

 Final Date:      2024 12:31

## 2025-06-25 ENCOUNTER — HOSPITAL ENCOUNTER (OUTPATIENT)
Dept: HOSPITAL 47 - LABWHC1 | Age: 70
Discharge: HOME | End: 2025-06-25
Attending: INTERNAL MEDICINE
Payer: MEDICARE

## 2025-06-25 DIAGNOSIS — E03.8: Primary | ICD-10-CM

## 2025-06-25 LAB
T3FREE SERPL-MCNC: 2.2 PG/ML (ref 2.3–4.2)
T4 FREE SERPL-MCNC: 1.1 NG/DL (ref 0.8–1.8)
TSH SERPL DL<=0.005 MIU/L-ACNC: 1.8 UIU/ML (ref 0.35–5.5)

## 2025-06-25 PROCEDURE — 84439 ASSAY OF FREE THYROXINE: CPT

## 2025-06-25 PROCEDURE — 84481 FREE ASSAY (FT-3): CPT

## 2025-06-25 PROCEDURE — 36415 COLL VENOUS BLD VENIPUNCTURE: CPT

## 2025-06-25 PROCEDURE — 84443 ASSAY THYROID STIM HORMONE: CPT

## 2025-07-30 ENCOUNTER — HOSPITAL ENCOUNTER (OUTPATIENT)
Dept: HOSPITAL 47 - LABWHC1 | Age: 70
Discharge: HOME | End: 2025-07-30
Attending: INTERNAL MEDICINE
Payer: MEDICARE

## 2025-07-30 DIAGNOSIS — E03.8: Primary | ICD-10-CM

## 2025-07-30 PROCEDURE — 36415 COLL VENOUS BLD VENIPUNCTURE: CPT

## 2025-07-30 PROCEDURE — 84443 ASSAY THYROID STIM HORMONE: CPT
